# Patient Record
Sex: MALE | Race: WHITE | URBAN - METROPOLITAN AREA
[De-identification: names, ages, dates, MRNs, and addresses within clinical notes are randomized per-mention and may not be internally consistent; named-entity substitution may affect disease eponyms.]

---

## 2021-08-09 ENCOUNTER — OFFICE VISIT (OUTPATIENT)
Dept: URBAN - METROPOLITAN AREA CLINIC 81 | Facility: CLINIC | Age: 66
End: 2021-08-09
Payer: COMMERCIAL

## 2021-08-09 DIAGNOSIS — H25.13 AGE-RELATED NUCLEAR CATARACT, BILATERAL: ICD-10-CM

## 2021-08-09 DIAGNOSIS — H52.4 PRESBYOPIA: Primary | ICD-10-CM

## 2021-08-09 DIAGNOSIS — E11.9 TYPE 2 DIABETES MELLITUS W/O COMPLICATION: ICD-10-CM

## 2021-08-09 PROCEDURE — 92004 COMPRE OPH EXAM NEW PT 1/>: CPT | Performed by: OPTOMETRIST

## 2021-08-09 ASSESSMENT — KERATOMETRY
OD: 40.88
OS: 41.13

## 2021-08-09 ASSESSMENT — VISUAL ACUITY
OD: 20/20
OS: 20/20

## 2021-08-09 ASSESSMENT — INTRAOCULAR PRESSURE
OS: 13
OD: 13

## 2021-08-09 NOTE — IMPRESSION/PLAN
Impression: Type 2 diabetes mellitus w/o complication: L41.3. Bilateral. Plan: Diabetes type II: no background diabetic retinopathy, no signs of neovascularization noted. Discussed ocular and systemic benefits of blood sugar control. Continue to monitor blood sugar and continue care with PCP. Advised patient to RTC immediately if changes to vision are noted. Continue to monitor for changes.

## 2022-08-24 ENCOUNTER — OFFICE VISIT (OUTPATIENT)
Dept: URBAN - METROPOLITAN AREA CLINIC 81 | Facility: CLINIC | Age: 67
End: 2022-08-24
Payer: COMMERCIAL

## 2022-08-24 DIAGNOSIS — H25.13 AGE-RELATED NUCLEAR CATARACT, BILATERAL: ICD-10-CM

## 2022-08-24 DIAGNOSIS — E11.9 TYPE 2 DIABETES MELLITUS W/O COMPLICATION: Primary | ICD-10-CM

## 2022-08-24 DIAGNOSIS — H52.4 PRESBYOPIA: ICD-10-CM

## 2022-08-24 PROCEDURE — 92014 COMPRE OPH EXAM EST PT 1/>: CPT | Performed by: OPTOMETRIST

## 2022-08-24 ASSESSMENT — VISUAL ACUITY
OS: 20/20
OD: 20/20

## 2022-08-24 ASSESSMENT — INTRAOCULAR PRESSURE
OS: 13
OD: 13

## 2022-08-24 ASSESSMENT — KERATOMETRY
OD: 40.75
OS: 41.13

## 2022-08-24 NOTE — IMPRESSION/PLAN
Impression: Type 2 diabetes mellitus w/o complication: V18.4 Bilateral. Plan: Diabetes type II: no background diabetic retinopathy, no signs of neovascularization noted. Discussed ocular and systemic benefits of blood sugar control. Continue to monitor blood sugar and continue care with PCP. Advised patient to return to clinic immediately if changes to vision are noted. Continue to monitor for changes.